# Patient Record
Sex: FEMALE
[De-identification: names, ages, dates, MRNs, and addresses within clinical notes are randomized per-mention and may not be internally consistent; named-entity substitution may affect disease eponyms.]

---

## 2017-06-02 ENCOUNTER — APPOINTMENT (OUTPATIENT)
Dept: OBGYN | Facility: CLINIC | Age: 62
End: 2017-06-02

## 2017-06-02 PROBLEM — Z00.00 ENCOUNTER FOR PREVENTIVE HEALTH EXAMINATION: Status: ACTIVE | Noted: 2017-06-02

## 2019-08-24 ENCOUNTER — EMERGENCY (EMERGENCY)
Facility: HOSPITAL | Age: 64
LOS: 1 days | Discharge: HOME | End: 2019-08-24
Attending: EMERGENCY MEDICINE
Payer: MEDICARE

## 2019-08-24 VITALS
DIASTOLIC BLOOD PRESSURE: 88 MMHG | HEIGHT: 64 IN | TEMPERATURE: 98 F | RESPIRATION RATE: 16 BRPM | SYSTOLIC BLOOD PRESSURE: 146 MMHG | HEART RATE: 68 BPM | WEIGHT: 125 LBS | OXYGEN SATURATION: 99 %

## 2019-08-24 PROCEDURE — 99283 EMERGENCY DEPT VISIT LOW MDM: CPT

## 2019-08-24 PROCEDURE — 99284 EMERGENCY DEPT VISIT MOD MDM: CPT

## 2019-08-24 RX ORDER — METOPROLOL TARTRATE 50 MG
25 TABLET ORAL ONCE
Refills: 0 | Status: COMPLETED | OUTPATIENT
Start: 2019-08-24 | End: 2019-08-24

## 2019-08-24 RX ORDER — ASPIRIN/CALCIUM CARB/MAGNESIUM 324 MG
81 TABLET ORAL ONCE
Refills: 0 | Status: COMPLETED | OUTPATIENT
Start: 2019-08-24 | End: 2019-08-24

## 2019-08-24 RX ADMIN — Medication 81 MILLIGRAM(S): at 07:53

## 2019-08-24 RX ADMIN — Medication 25 MILLIGRAM(S): at 07:53

## 2019-08-24 NOTE — ED ADULT TRIAGE NOTE - CHIEF COMPLAINT QUOTE
Patient a visitor upstairs in labor and delivery watching her family and grandkid, feeling emotional and anxious because she forgot her BP medication.

## 2019-08-24 NOTE — ED PROVIDER NOTE - NSFOLLOWUPINSTRUCTIONS_ED_ALL_ED_FT
Always take your meds as prescribed.  Please follow up with your personal medical doctor in 24-48 hours.   Bring results from today to your visit.  If your symptoms change, get worse or if you have any new symptoms, come to the ER right away.  If you have any questions, call the ER at the phone number on this page.

## 2019-08-24 NOTE — ED ADULT NURSE NOTE - NSIMPLEMENTINTERV_GEN_ALL_ED
Implemented All Universal Safety Interventions:  [place] to call system. Call bell, personal items and telephone within reach. Instruct patient to call for assistance. Room bathroom lighting operational. Non-slip footwear when patient is off stretcher. Physically safe environment: no spills, clutter or unnecessary equipment. Stretcher in lowest position, wheels locked, appropriate side rails in place.

## 2019-08-24 NOTE — ED PROVIDER NOTE - OBJECTIVE STATEMENT
63 female htn / hld / visiting upstairs and "feels funny" like when she misses her bp meds. did miss am dose metoprolo. no cp/sob/pleuritic/pain.

## 2019-08-25 RX ORDER — ATORVASTATIN CALCIUM 80 MG/1
1 TABLET, FILM COATED ORAL
Qty: 0 | Refills: 0 | DISCHARGE

## 2019-08-25 RX ORDER — ASPIRIN/CALCIUM CARB/MAGNESIUM 324 MG
1 TABLET ORAL
Qty: 0 | Refills: 0 | DISCHARGE

## 2019-08-25 RX ORDER — METFORMIN HYDROCHLORIDE 850 MG/1
1 TABLET ORAL
Qty: 0 | Refills: 0 | DISCHARGE

## 2019-08-25 RX ORDER — AMLODIPINE BESYLATE 2.5 MG/1
1 TABLET ORAL
Qty: 0 | Refills: 0 | DISCHARGE

## 2019-08-25 RX ORDER — METOPROLOL TARTRATE 50 MG
1 TABLET ORAL
Qty: 0 | Refills: 0 | DISCHARGE

## 2022-12-21 ENCOUNTER — OFFICE (OUTPATIENT)
Dept: URBAN - METROPOLITAN AREA CLINIC 6 | Facility: CLINIC | Age: 67
Setting detail: OPHTHALMOLOGY
End: 2022-12-21
Payer: COMMERCIAL

## 2022-12-21 DIAGNOSIS — H01.002: ICD-10-CM

## 2022-12-21 DIAGNOSIS — H26.492: ICD-10-CM

## 2022-12-21 DIAGNOSIS — H01.005: ICD-10-CM

## 2022-12-21 DIAGNOSIS — H01.001: ICD-10-CM

## 2022-12-21 DIAGNOSIS — H25.11: ICD-10-CM

## 2022-12-21 DIAGNOSIS — E11.9: ICD-10-CM

## 2022-12-21 DIAGNOSIS — H01.004: ICD-10-CM

## 2022-12-21 DIAGNOSIS — Z96.1: ICD-10-CM

## 2022-12-21 PROCEDURE — 92014 COMPRE OPH EXAM EST PT 1/>: CPT | Performed by: OPHTHALMOLOGY

## 2022-12-21 ASSESSMENT — REFRACTION_AUTOREFRACTION
OS_AXIS: 090
OS_CYLINDER: -1.50
OS_SPHERE: +1.75
OD_CYLINDER: -1.00
OD_AXIS: 095
OD_SPHERE: -1.75

## 2022-12-21 ASSESSMENT — REFRACTION_MANIFEST
OS_VA1: 20/20
OD_VA1: 20/30
OS_ADD: +2.50
OD_AXIS: 095
OD_SPHERE: -1.75
OS_CYLINDER: -1.50
OS_SPHERE: +1.50
OD_CYLINDER: -1.00
OD_SPHERE: -1.00
OS_AXIS: 90
OS_AXIS: 090
OS_CYLINDER: -1.25
OD_ADD: +2.50
OS_VA1: 20/20
OS_SPHERE: +1.75
OD_VA1: 20/40
OD_AXIS: 100
OD_CYLINDER: -0.75

## 2022-12-21 ASSESSMENT — SPHEQUIV_DERIVED
OS_SPHEQUIV: 1
OD_SPHEQUIV: -2.25
OD_SPHEQUIV: -1.375
OD_SPHEQUIV: -2.25
OS_SPHEQUIV: 1
OS_SPHEQUIV: 0.875

## 2022-12-21 ASSESSMENT — KERATOMETRY
OS_AXISANGLE_DEGREES: 173
OD_K1POWER_DIOPTERS: 43.75
OD_K2POWER_DIOPTERS: 44.25
OS_K1POWER_DIOPTERS: 43.00
OS_K2POWER_DIOPTERS: 44.00
OD_AXISANGLE_DEGREES: 036

## 2022-12-21 ASSESSMENT — LID EXAM ASSESSMENTS
OS_BLEPHARITIS: LLL LUL
OD_BLEPHARITIS: RLL RUL

## 2022-12-21 ASSESSMENT — REFRACTION_CURRENTRX
OD_OVR_VA: 20/
OS_OVR_VA: 20/

## 2022-12-21 ASSESSMENT — TONOMETRY
OS_IOP_MMHG: 19
OD_IOP_MMHG: 16

## 2022-12-21 ASSESSMENT — AXIALLENGTH_DERIVED
OD_AL: 23.9493
OS_AL: 23.2086
OD_AL: 24.3058
OD_AL: 24.3058
OS_AL: 23.2086
OS_AL: 23.2559

## 2022-12-21 ASSESSMENT — CONFRONTATIONAL VISUAL FIELD TEST (CVF)
OD_FINDINGS: FULL
OS_FINDINGS: FULL

## 2022-12-21 ASSESSMENT — VISUAL ACUITY
OS_BCVA: 20/70
OD_BCVA: 20/20-2

## 2024-12-09 NOTE — ED ADULT NURSE NOTE - NS_NURSE_DISC_TEACHING_YN_ED_ALL_ED
Patient left without being seen at last scheduled appt - next OV scheduled for April 2025. Okay to fill?  
Yes